# Patient Record
Sex: FEMALE | Race: WHITE | NOT HISPANIC OR LATINO | Employment: FULL TIME | URBAN - METROPOLITAN AREA
[De-identification: names, ages, dates, MRNs, and addresses within clinical notes are randomized per-mention and may not be internally consistent; named-entity substitution may affect disease eponyms.]

---

## 2022-05-22 ENCOUNTER — HOSPITAL ENCOUNTER (EMERGENCY)
Facility: MEDICAL CENTER | Age: 32
End: 2022-05-22
Attending: EMERGENCY MEDICINE
Payer: COMMERCIAL

## 2022-05-22 ENCOUNTER — APPOINTMENT (OUTPATIENT)
Dept: RADIOLOGY | Facility: MEDICAL CENTER | Age: 32
End: 2022-05-22
Attending: EMERGENCY MEDICINE
Payer: COMMERCIAL

## 2022-05-22 VITALS
DIASTOLIC BLOOD PRESSURE: 65 MMHG | BODY MASS INDEX: 35.8 KG/M2 | HEIGHT: 61 IN | HEART RATE: 68 BPM | WEIGHT: 189.6 LBS | TEMPERATURE: 98.2 F | SYSTOLIC BLOOD PRESSURE: 119 MMHG | OXYGEN SATURATION: 95 % | RESPIRATION RATE: 15 BRPM

## 2022-05-22 DIAGNOSIS — N93.8 DYSFUNCTIONAL UTERINE BLEEDING: ICD-10-CM

## 2022-05-22 DIAGNOSIS — D21.9 FIBROIDS: ICD-10-CM

## 2022-05-22 LAB
ALBUMIN SERPL BCP-MCNC: 4.4 G/DL (ref 3.2–4.9)
ALBUMIN/GLOB SERPL: 1.7 G/DL
ALP SERPL-CCNC: 44 U/L (ref 30–99)
ALT SERPL-CCNC: 9 U/L (ref 2–50)
ANION GAP SERPL CALC-SCNC: 10 MMOL/L (ref 7–16)
APPEARANCE UR: CLEAR
APTT PPP: 27.4 SEC (ref 24.7–36)
AST SERPL-CCNC: 13 U/L (ref 12–45)
BACTERIA #/AREA URNS HPF: NEGATIVE /HPF
BACTERIA GENITAL QL WET PREP: NORMAL
BASOPHILS # BLD AUTO: 0.8 % (ref 0–1.8)
BASOPHILS # BLD: 0.05 K/UL (ref 0–0.12)
BILIRUB SERPL-MCNC: 0.2 MG/DL (ref 0.1–1.5)
BILIRUB UR QL STRIP.AUTO: NEGATIVE
BUN SERPL-MCNC: 15 MG/DL (ref 8–22)
CALCIUM SERPL-MCNC: 9.2 MG/DL (ref 8.5–10.5)
CHLORIDE SERPL-SCNC: 104 MMOL/L (ref 96–112)
CO2 SERPL-SCNC: 23 MMOL/L (ref 20–33)
COLOR UR: YELLOW
CREAT SERPL-MCNC: 0.85 MG/DL (ref 0.5–1.4)
EOSINOPHIL # BLD AUTO: 0.11 K/UL (ref 0–0.51)
EOSINOPHIL NFR BLD: 1.8 % (ref 0–6.9)
EPI CELLS #/AREA URNS HPF: NEGATIVE /HPF
ERYTHROCYTE [DISTWIDTH] IN BLOOD BY AUTOMATED COUNT: 44 FL (ref 35.9–50)
GFR SERPLBLD CREATININE-BSD FMLA CKD-EPI: 92 ML/MIN/1.73 M 2
GLOBULIN SER CALC-MCNC: 2.6 G/DL (ref 1.9–3.5)
GLUCOSE SERPL-MCNC: 76 MG/DL (ref 65–99)
GLUCOSE UR STRIP.AUTO-MCNC: NEGATIVE MG/DL
HCG SERPL QL: NEGATIVE
HCT VFR BLD AUTO: 40.6 % (ref 37–47)
HGB BLD-MCNC: 13.7 G/DL (ref 12–16)
IMM GRANULOCYTES # BLD AUTO: 0.02 K/UL (ref 0–0.11)
IMM GRANULOCYTES NFR BLD AUTO: 0.3 % (ref 0–0.9)
INR PPP: 0.94 (ref 0.87–1.13)
KETONES UR STRIP.AUTO-MCNC: NEGATIVE MG/DL
LEUKOCYTE ESTERASE UR QL STRIP.AUTO: NEGATIVE
LYMPHOCYTES # BLD AUTO: 1.53 K/UL (ref 1–4.8)
LYMPHOCYTES NFR BLD: 25 % (ref 22–41)
MCH RBC QN AUTO: 30 PG (ref 27–33)
MCHC RBC AUTO-ENTMCNC: 33.7 G/DL (ref 33.6–35)
MCV RBC AUTO: 89 FL (ref 81.4–97.8)
MICRO URNS: ABNORMAL
MONOCYTES # BLD AUTO: 0.55 K/UL (ref 0–0.85)
MONOCYTES NFR BLD AUTO: 9 % (ref 0–13.4)
NEUTROPHILS # BLD AUTO: 3.85 K/UL (ref 2–7.15)
NEUTROPHILS NFR BLD: 63.1 % (ref 44–72)
NITRITE UR QL STRIP.AUTO: NEGATIVE
NRBC # BLD AUTO: 0 K/UL
NRBC BLD-RTO: 0 /100 WBC
PH UR STRIP.AUTO: 5 [PH] (ref 5–8)
PLATELET # BLD AUTO: 330 K/UL (ref 164–446)
PMV BLD AUTO: 8.3 FL (ref 9–12.9)
POTASSIUM SERPL-SCNC: 4.4 MMOL/L (ref 3.6–5.5)
PROT SERPL-MCNC: 7 G/DL (ref 6–8.2)
PROT UR QL STRIP: NEGATIVE MG/DL
PROTHROMBIN TIME: 12.3 SEC (ref 12–14.6)
RBC # BLD AUTO: 4.56 M/UL (ref 4.2–5.4)
RBC # URNS HPF: ABNORMAL /HPF
RBC UR QL AUTO: ABNORMAL
SIGNIFICANT IND 70042: NORMAL
SITE SITE: NORMAL
SODIUM SERPL-SCNC: 137 MMOL/L (ref 135–145)
SOURCE SOURCE: NORMAL
SP GR UR STRIP.AUTO: 1.02
UROBILINOGEN UR STRIP.AUTO-MCNC: 0.2 MG/DL
WBC # BLD AUTO: 6.1 K/UL (ref 4.8–10.8)
WBC #/AREA URNS HPF: ABNORMAL /HPF

## 2022-05-22 PROCEDURE — 85025 COMPLETE CBC W/AUTO DIFF WBC: CPT

## 2022-05-22 PROCEDURE — 85610 PROTHROMBIN TIME: CPT

## 2022-05-22 PROCEDURE — 85730 THROMBOPLASTIN TIME PARTIAL: CPT

## 2022-05-22 PROCEDURE — 76856 US EXAM PELVIC COMPLETE: CPT

## 2022-05-22 PROCEDURE — 36415 COLL VENOUS BLD VENIPUNCTURE: CPT

## 2022-05-22 PROCEDURE — 81001 URINALYSIS AUTO W/SCOPE: CPT

## 2022-05-22 PROCEDURE — 99284 EMERGENCY DEPT VISIT MOD MDM: CPT

## 2022-05-22 PROCEDURE — 87491 CHLMYD TRACH DNA AMP PROBE: CPT

## 2022-05-22 PROCEDURE — 84703 CHORIONIC GONADOTROPIN ASSAY: CPT

## 2022-05-22 PROCEDURE — 80053 COMPREHEN METABOLIC PANEL: CPT

## 2022-05-22 PROCEDURE — 87591 N.GONORRHOEAE DNA AMP PROB: CPT

## 2022-05-22 NOTE — ED PROVIDER NOTES
ED Provider Note    Scribed for Elaine Barker M.D. by Garcia Oliva. 5/22/2022  12:07 PM    Primary care provider: No primary care provider noted  Means of arrival: Walk in  History obtained from: Patient  History limited by: None    CHIEF COMPLAINT  Chief Complaint   Patient presents with   • Vaginal Bleeding   • Dizziness     Pt reports heavy bleeding yesterday during menses. Pt reports going through a pad maybe every half an hour all day with large clots and cramping. Today pt now with exhaustion and dizziness.        HPI  Martha Perry is a 31 y.o. female who presents for evaluation of heavy vaginal bleeding onset yesterday.  Patient believed she was just starting her menstrual period yesterday. Her last menstrual period was 4/18/22 which was normal. Her periods normally last 3-5 days and are relatively heavy at baseline. . She had to change her tampon every 15-30 minutes yesterday stating that she went through an entire box of tampons yesterday.  This is unusual for her as normally she will go through 6-8 tampons a day on her heavy flow days. Patient was bleeding excessively to the point where she was saturating her pants. Her bleeding decreased at around 5:30 a.m. this morning. She has been having very large clots the size of her tampons. She normally passes clots with her menstrual periods that are much smaller. She admits to associated symptoms of dizziness and lightheadedness, but denies fever, chills, coughing blood, vomiting blood, or blood in stool. No alleviating factors were reported. Patient has only had one partner for 10 years. She is not taking any blood thinners. Patient just moved into town and does not have a primary physician or and GYN yet.     REVIEW OF SYSTEMS  Pertinent positives include: dizziness, lightheadedness, vaginal bleeding.   Pertinent negatives include no: fever, chills, coughing blood, vomiting blood, or blood in stool.    See HPI for further details. All other  "systems are negative.    PAST MEDICAL HISTORY       FAMILY HISTORY  History reviewed. No pertinent family history.    SOCIAL HISTORY  Social History     Tobacco Use   • Smoking status: Former Smoker   • Smokeless tobacco: Never Used   Substance Use Topics   • Alcohol use: Yes   • Drug use: Never      Social History     Substance and Sexual Activity   Drug Use Never       SURGICAL HISTORY  History reviewed. No pertinent surgical history.    CURRENT MEDICATIONS  Home Medications    **Home medications have not yet been reviewed for this encounter**         ALLERGIES  Allergies   Allergen Reactions   • Iodine        PHYSICAL EXAM  VITAL SIGNS: /78   Pulse 78   Temp 36.2 °C (97.1 °F) (Temporal)   Resp 16   Ht 1.549 m (5' 1\")   Wt 86 kg (189 lb 9.5 oz)   SpO2 99%   BMI 35.82 kg/m²      Constitutional: Well developed, well nourished; No acute distress; Non-toxic appearance.   HENT: Normocephalic, atraumatic; Bilateral external ears normal; Oropharyngeal exam deferred to COVID-19 outbreak and lack of oropharyngeal complaints.   Eyes: PERRL, EOMI, Conjunctiva normal. No discharge.   Neck:  Supple, nontender midline; No stridor; No nuchal rigidity.   Lymphatic: No cervical lymphadenopathy noted.   Cardiovascular: Regular rate and rhythm without murmurs, rubs, or gallop.   Thorax & Lungs: No respiratory distress, breath sounds clear to auscultation bilaterally without wheezing, rales or rhonchi. Nontender chest wall. No crepitus or subcutaneous air  Abdomen: Soft, nontender, bowel sounds normal. No obvious masses; No pulsatile masses; no rebound, guarding, or peritoneal signs.   Pelvic Exam:  Normal external genitalia without lesions or vesicles, some dark red blood in vault without clot which was easily cleared using 3 large cotton-tip applicators, no midline or adnexal tenderness  Skin: Good color; warm and dry without rash or petechia.  Back: Nontender, No CVA tenderness.   Extremities: Distal radial, dorsalis " pedis, posterior tibial pulses are equal bilaterally; No edema; Nontender calves or saphenous, No cyanosis, No clubbing.   Musculoskeletal: Good range of motion in all major joints. No tenderness to palpation or major deformities noted.   Neurologic: Alert & oriented x 4, clear speech.       LABS/RADIOLOGY/PROCEDURES  Results for orders placed or performed during the hospital encounter of 05/22/22   CBC WITH DIFFERENTIAL   Result Value Ref Range    WBC 6.1 4.8 - 10.8 K/uL    RBC 4.56 4.20 - 5.40 M/uL    Hemoglobin 13.7 12.0 - 16.0 g/dL    Hematocrit 40.6 37.0 - 47.0 %    MCV 89.0 81.4 - 97.8 fL    MCH 30.0 27.0 - 33.0 pg    MCHC 33.7 33.6 - 35.0 g/dL    RDW 44.0 35.9 - 50.0 fL    Platelet Count 330 164 - 446 K/uL    MPV 8.3 (L) 9.0 - 12.9 fL    Neutrophils-Polys 63.10 44.00 - 72.00 %    Lymphocytes 25.00 22.00 - 41.00 %    Monocytes 9.00 0.00 - 13.40 %    Eosinophils 1.80 0.00 - 6.90 %    Basophils 0.80 0.00 - 1.80 %    Immature Granulocytes 0.30 0.00 - 0.90 %    Nucleated RBC 0.00 /100 WBC    Neutrophils (Absolute) 3.85 2.00 - 7.15 K/uL    Lymphs (Absolute) 1.53 1.00 - 4.80 K/uL    Monos (Absolute) 0.55 0.00 - 0.85 K/uL    Eos (Absolute) 0.11 0.00 - 0.51 K/uL    Baso (Absolute) 0.05 0.00 - 0.12 K/uL    Immature Granulocytes (abs) 0.02 0.00 - 0.11 K/uL    NRBC (Absolute) 0.00 K/uL   COMP METABOLIC PANEL   Result Value Ref Range    Sodium 137 135 - 145 mmol/L    Potassium 4.4 3.6 - 5.5 mmol/L    Chloride 104 96 - 112 mmol/L    Co2 23 20 - 33 mmol/L    Anion Gap 10.0 7.0 - 16.0    Glucose 76 65 - 99 mg/dL    Bun 15 8 - 22 mg/dL    Creatinine 0.85 0.50 - 1.40 mg/dL    Calcium 9.2 8.5 - 10.5 mg/dL    AST(SGOT) 13 12 - 45 U/L    ALT(SGPT) 9 2 - 50 U/L    Alkaline Phosphatase 44 30 - 99 U/L    Total Bilirubin 0.2 0.1 - 1.5 mg/dL    Albumin 4.4 3.2 - 4.9 g/dL    Total Protein 7.0 6.0 - 8.2 g/dL    Globulin 2.6 1.9 - 3.5 g/dL    A-G Ratio 1.7 g/dL   HCG QUAL SERUM   Result Value Ref Range    Beta-Hcg Qualitative Serum  Negative Negative   ESTIMATED GFR   Result Value Ref Range    GFR (CKD-EPI) 92 >60 mL/min/1.73 m 2   PROTHROMBIN TIME (INR)   Result Value Ref Range    PT 12.3 12.0 - 14.6 sec    INR 0.94 0.87 - 1.13   APTT   Result Value Ref Range    APTT 27.4 24.7 - 36.0 sec   Chlamydia/GC, PCR (Urine)    Specimen: Genital   Result Value Ref Range    Source Other    URINALYSIS (UA)    Specimen: Genital   Result Value Ref Range    Color Yellow     Character Clear     Specific Gravity 1.022 <1.035    Ph 5.0 5.0 - 8.0    Glucose Negative Negative mg/dL    Ketones Negative Negative mg/dL    Protein Negative Negative mg/dL    Bilirubin Negative Negative    Urobilinogen, Urine 0.2 Negative    Nitrite Negative Negative    Leukocyte Esterase Negative Negative    Occult Blood Large (A) Negative    Micro Urine Req Microscopic    URINE MICROSCOPIC (W/UA)   Result Value Ref Range    WBC 0-2 /hpf    RBC 10-20 (A) /hpf    Bacteria Negative None /hpf    Epithelial Cells Negative /hpf         All labs reviewed by me.    US-PELVIC COMPLETE (TRANSABDOMINAL/TRANSVAGINAL) (COMBO)   Final Result      1.  Prominent uterus with multiple fibroids distorting the fundus, and also within the cervix.  Largest measures 4.6 cm.   2.  Mild endometrial thickening, likely due to phase of cycle.   3.  Small amount of free fluid, likely physiologic.   4.  Ovaries are not visualized, obscured by bowel gas.          The radiologist's interpretation of all radiological studies have been reviewed by me.      COURSE & MEDICAL DECISION MAKING  Pertinent Labs & Imaging studies reviewed. (See chart for details)    12:07 PM - Patient seen and examined at bedside. Discussed plan of care, including labs and images. Patient agrees to the plan of care. Ordered for US-Pelvic and labs to evaluate her symptoms.     2:21 PM Performed pelvic exam at bedside with female chaperone present.     Patient presents to the ER with complaints of heavy menstrual bleeding which occurred  yesterday.  Her menstrual cycle began at his normal time 2 days ago.  First day she had spotting for yesterday she was bleeding quite heavy.  She typically has heavy menstrual cycles but has never gone through a whole box of tampons in 1 day.  Usually she will go through about 6-8 super tampons a day with her cycles.  She typically gets a little bit of clots and some menstrual cramping with her periods.  She has been having larger clots with this particular menstrual cycle as well as some increase in her pelvic cramping.  However, the bleeding did start subsiding earlier this morning.  She is not hemodynamically unstable.  Her hemoglobin is normal at 13.7.  Urine is clear other than blood.  No signs of UTI.  She did have a small amount of dark blood in the vaginal vault which was cleared using 3 cotton tip applicators.  There is a very slight ooze coming from the os.  She is otherwise well-appearing.  No tenderness on her examination of the abdomen or the pelvis.  Pelvic ultrasound reveals a prominent uterus with multiple fibroids distorting the fundus and also within the cervix.  Ovaries were not visualized as they were obscured by bowel gas.  However, the patient does not have any pain or tenderness and I have low suspicion for torsion.  She is not pregnant.  At this time no concern for ectopic or threatened miscarriage.  The patient is safe and stable for outpatient management discharge home.  She does not have a gynecologist in the local area.  She will need to see gynecology for these fibroids.  I referred her to gynecology on-call.  She has been given very strict return precautions and discharge instructions for vaginal bleeding and she understands treatment plan and follow-up.  She is to call gynecology tomorrow for appointment time.    FINAL IMPRESSION  1. Dysfunctional uterine bleeding Acute   2. Fibroids Acute         IGarcia (Deena), am scribing for, and in the presence of, Elaine Barker,  M.D..    Electronically signed by: Garcia Oliva (Scribe), 5/22/2022    IElaine M.D. personally performed the services described in this documentation, as scribed by Garcia Oliva in my presence, and it is both accurate and complete.    This dictation has been created using voice recognition software. The accuracy of the dictation is limited by the abilities of the software. I expect there may be some errors of grammar and possibly content. I made every attempt to manually correct the errors within my dictation. However, errors related to voice recognition software may still exist and should be interpreted within the appropriate context.    The note accurately reflects work and decisions made by me.  Elaine Barker M.D.  5/22/2022  2:57 PM

## 2022-05-22 NOTE — DISCHARGE INSTRUCTIONS
Follow-up with Dr. Lee, gynecologist, within the next 1 to 2 days.  Please call tomorrow to schedule your appointment.    Drink plenty of fluids to stay well-hydrated.    Return to the ER for any worsening vaginal bleeding, increased patches of clots, dizziness or lightheadedness, worsening pelvic pain or cramping, bleeding from any other areas, or for any concerns.

## 2022-05-22 NOTE — ED TRIAGE NOTES
Chief Complaint   Patient presents with   • Vaginal Bleeding   • Dizziness     Pt reports heavy bleeding yesterday during menses. Pt reports going through a pad maybe every half an hour all day with large clots and cramping. Today pt now with exhaustion and dizziness.        Explained to pt triage process, made pt aware to tell this RN/staff of any changes/concerns, pt verbalized understanding of process and instructions given. Pt to VALENTE terrell.

## 2022-05-23 LAB
C TRACH DNA GENITAL QL NAA+PROBE: NEGATIVE
N GONORRHOEA DNA GENITAL QL NAA+PROBE: NEGATIVE
SPECIMEN SOURCE: NORMAL

## 2022-05-26 ENCOUNTER — TELEPHONE (OUTPATIENT)
Dept: OBGYN | Facility: CLINIC | Age: 32
End: 2022-05-26
Payer: COMMERCIAL